# Patient Record
(demographics unavailable — no encounter records)

---

## 2024-10-14 NOTE — REVIEW OF SYSTEMS
[Negative] : Hematologic [Fever] : no fever [Fatigue] : no fatigue [Chills] : no chills [Sore Throat] : no sore throat [Nasal Congestion] : no nasal congestion [Cough] : no cough [Chest Discomfort] : no chest discomfort [Abdominal Pain] : no abdominal pain [Nausea] : no nausea [Diarrhea] : no diarrhea [Myalgias] : no myalgias [TextBox_3] : felt  warm [TextBox_144] : Pituitary adenoma

## 2024-10-14 NOTE — DISCUSSION/SUMMARY
[FreeTextEntry1] : Well visit Positive weight loss on Wegovy then changed to Zepbound titrating down to a plateau dose Patient has some concerns with weight loss or any other underlying issues but she is on appropriate therapy with goal to lose weight Left lower quadrant palpable lesion do not think this is of hernia or mass Basically be muscle patient's weight loss Sonogram was completed and is pending Flu vaccination up-to-date Patient follow-up with GI for timing for colonoscopy Maintain up-to-date GYN mammogram Bone density March 2025 Coronary artery calcium score discussed with patient due to hyperlipidemia  r/o RADHA HSS  Post  OP ORTHO right Knee arthroscopy Well visit 1/31/22 HTN anti cardiolipin AB positive Pre DM on ENDO  management post COVID pos RF osteoporosis -f/u ENDO  labs March CPE on ASA per Rheum I  stop  check

## 2024-10-14 NOTE — PHYSICAL EXAM
[No Acute Distress] : no acute distress [Well Nourished] : well nourished [Well Developed] : well developed [Normal Oropharynx] : normal oropharynx [Normal Appearance] : normal appearance [Supple] : supple [No Neck Mass] : no neck mass [No JVD] : no jvd [Normal Rate/Rhythm] : normal rate/rhythm [Normal S1, S2] : normal s1, s2 [No Murmurs] : no murmurs [No Resp Distress] : no resp distress [No Acc Muscle Use] : no acc muscle use [Normal Palpation] : normal palpation [Normal Rhythm and Effort] : normal rhythm and effort [Clear to Auscultation Bilaterally] : clear to auscultation bilaterally [No Abnormalities] : no abnormalities [Benign] : benign [Not Tender] : not tender [Soft] : soft [No HSM] : no hsm [Normal Bowel Sounds] : normal bowel sounds [Normal Gait] : normal gait [No Clubbing] : no clubbing [No Cyanosis] : no cyanosis [No Edema] : no edema [Normal Color/ Pigmentation] : normal color/ pigmentation [No Focal Deficits] : no focal deficits [Oriented x3] : oriented x3

## 2024-10-14 NOTE — ASSESSMENT
[FreeTextEntry1] : recent URI requesting covid ab levels to be checked drawn today  will call with results

## 2024-10-14 NOTE — PROCEDURE
[FreeTextEntry1] : Blood draw  Chest x-ray PA lateral October 14 2024 Cardiac size normal Lung fields clear No parenchymal infiltrates pleural effusions dominant pulmonary nodules Very minimal scoliosis Overall impression clear lungs without interval change EKG 10/14/24 Normal sinus rhythm  Chest x-ray PA lateral November 17, 2022 Left basilar minimal discoid atelectatic change No evidence for pneumonia note no parenchymal consolidation No pleural effusion or pneumothorax or dominant pulmonary nodules Impression overall clear lungs  blood draw

## 2024-10-14 NOTE — HISTORY OF PRESENT ILLNESS
[Never] : never [Awakes with Dry Mouth] : awakes with dry mouth [Fatigue] : fatigue [Snoring] : snoring [TextBox_4] : 61year-old female ENDO Dr Topete initial Wegovy then off and RS with Zepbound pos 40  lb weight loss pos  COVID Ab high antibody response  pos  hernia  at Urgent care with [facility] pending  pos family  exposure 3  mos prior pos COVID but pt PCR neg and prior hx COVID She did have a prior history of a COVID infection with a slow recovery Other medical medical history of significance includes Anticardiolipin antibody positive Pituitary adenoma Prediabetes Positive rheumatoid factor Hyperlipidemia on  statin

## 2024-12-13 NOTE — HISTORY OF PRESENT ILLNESS
[FreeTextEntry1] : This is a 62 y/o female with a pmhx of Anticardiolipin antibody positive, Pituitary adenoma, Prediabetes, HLD, hx of spindle cell neoplasm with myofibroblastic differential here today to establish care. Patient had removal of spindle cell neoplasm of right hand with Dr. Pranav Sosa s/p multiple surgeries,  Patient had recent lipids panel with . Calcium score 4 (12/2024). Patient states she was on Crestor in the past and felt muscle cramps. She reports muscle aches on Zetia.  Patient denies chest pain, dyspnea, palpitations, dizziness, syncope, changes in bowel/bladder habits or appetite.

## 2025-02-05 NOTE — HISTORY OF PRESENT ILLNESS
[FreeTextEntry1] : This is a 60 y/o female with a pmhx of Anticardiolipin antibody positive, Pituitary adenoma, Prediabetes, HLD, hx of spindle cell neoplasm with myofibroblastic differential here today for routine follow-up. Last OV 12/13/2024, to establish care. Patient recently restarted on Repatha due to intolerance to statin and zetia. CT abd/pelv 12/28/2024: enhancing 3cm mass arising from the left lower renal pole suspicious for renal cell carcinoma. Further evaluation with MRI with and without contrast may be of benefit to evaluate for presence of fat within. MR Abdomen and pelvis w/wo contrast: A 3.1 cm solid left lower pole renal mass, renal cell carcinoma until proven otherwise. Patient states that she is following with [facility] and scheduled for resection of kidney in 3 weeks.   Denies chest pain, palpitations, diaphoresis, vision changes, HA, dizziness, syncope, cough, wheezing, edema, fever, chills, infection.